# Patient Record
Sex: FEMALE | Race: BLACK OR AFRICAN AMERICAN | NOT HISPANIC OR LATINO
[De-identification: names, ages, dates, MRNs, and addresses within clinical notes are randomized per-mention and may not be internally consistent; named-entity substitution may affect disease eponyms.]

---
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
## 2021-12-24 ENCOUNTER — EMERGENCY (EMERGENCY)
Facility: HOSPITAL | Age: 21
LOS: 1 days | Discharge: HOME | End: 2021-12-24
Attending: EMERGENCY MEDICINE | Admitting: EMERGENCY MEDICINE
Payer: COMMERCIAL

## 2021-12-24 VITALS
OXYGEN SATURATION: 100 % | HEART RATE: 95 BPM | RESPIRATION RATE: 18 BRPM | DIASTOLIC BLOOD PRESSURE: 79 MMHG | TEMPERATURE: 98 F | SYSTOLIC BLOOD PRESSURE: 128 MMHG

## 2021-12-24 VITALS
SYSTOLIC BLOOD PRESSURE: 120 MMHG | DIASTOLIC BLOOD PRESSURE: 80 MMHG | HEART RATE: 98 BPM | TEMPERATURE: 100 F | OXYGEN SATURATION: 100 % | RESPIRATION RATE: 18 BRPM

## 2021-12-24 LAB
APPEARANCE UR: CLEAR — SIGNIFICANT CHANGE UP
BACTERIA # UR AUTO: NEGATIVE — SIGNIFICANT CHANGE UP
BILIRUB UR-MCNC: NEGATIVE — SIGNIFICANT CHANGE UP
COLOR SPEC: SIGNIFICANT CHANGE UP
DIFF PNL FLD: NEGATIVE — SIGNIFICANT CHANGE UP
EPI CELLS # UR: SIGNIFICANT CHANGE UP
FLUAV AG NPH QL: SIGNIFICANT CHANGE UP
FLUBV AG NPH QL: SIGNIFICANT CHANGE UP
GLUCOSE UR QL: NEGATIVE — SIGNIFICANT CHANGE UP
KETONES UR-MCNC: ABNORMAL
LEUKOCYTE ESTERASE UR-ACNC: ABNORMAL
NITRITE UR-MCNC: NEGATIVE — SIGNIFICANT CHANGE UP
PH UR: 6 — SIGNIFICANT CHANGE UP (ref 5–8)
PROT UR-MCNC: ABNORMAL
RBC CASTS # UR COMP ASSIST: 0 /HPF — SIGNIFICANT CHANGE UP (ref 0–4)
RSV RNA NPH QL NAA+NON-PROBE: SIGNIFICANT CHANGE UP
SARS-COV-2 RNA SPEC QL NAA+PROBE: DETECTED
SP GR SPEC: 1.02 — SIGNIFICANT CHANGE UP (ref 1–1.05)
UROBILINOGEN FLD QL: SIGNIFICANT CHANGE UP
WBC UR QL: 2 /HPF — SIGNIFICANT CHANGE UP (ref 0–5)

## 2021-12-24 PROCEDURE — 99284 EMERGENCY DEPT VISIT MOD MDM: CPT

## 2021-12-24 RX ORDER — LIDOCAINE 4 G/100G
1 CREAM TOPICAL ONCE
Refills: 0 | Status: COMPLETED | OUTPATIENT
Start: 2021-12-24 | End: 2021-12-24

## 2021-12-24 RX ORDER — ACETAMINOPHEN 500 MG
975 TABLET ORAL ONCE
Refills: 0 | Status: COMPLETED | OUTPATIENT
Start: 2021-12-24 | End: 2021-12-24

## 2021-12-24 RX ADMIN — LIDOCAINE 1 PATCH: 4 CREAM TOPICAL at 08:03

## 2021-12-24 RX ADMIN — Medication 975 MILLIGRAM(S): at 08:03

## 2021-12-24 NOTE — ED PROVIDER NOTE - CLINICAL SUMMARY MEDICAL DECISION MAKING FREE TEXT BOX
Dereje Mcgee, PGY-2- 21 year old female with no pmhx, is here for back pain, fever to 101F, sputum production. Vitals stable on arrival. Pt well-appearing, not coughing during exam. Suspect viral infxn. Plan to obtain covid-19, influenza, rsv swab, UA, UCx, likely dc home. Treat back pain. Possible URI vs pyelo vs rhabdo (though suspicion is low)

## 2021-12-24 NOTE — ED PROVIDER NOTE - NSFOLLOWUPINSTRUCTIONS_ED_ALL_ED_FT
1) Follow up with your PCP within 1-3 days of being seen in the ED  2) Return to the ED immediately for new or worsening symptoms difficulty breathing, severe pain, unable to stay hydrated, vomiting, blood in urine   3) Please continue to take any home medications as prescribed  4) Your test results from your ED visit were discussed with you prior to discharge  5) You were provided with a copy of your test results  6) Please take Ibuprofen 600 mg every 6 hours and Tylenol 975 mg every 6 hours for pain. Please follow all pharmacy packaging instructions and warnings. Please take the Ibuprofen with food.

## 2021-12-24 NOTE — ED ADULT NURSE NOTE - OBJECTIVE STATEMENT
Received pt in intake room 7, 21 yr/o female A+Ox4, ambulatory at baseline. presented to the ED C/O lower back pain (lumbar spine) sharp 9/10, pain S/P workout at gym, as per patient back exercise performed. pain radiates down B/L LE denies numbness and tingling to peripheral extremities, no edema noted. pt is able to perform active and passive ROM exercises. pt reports being febrile yesterday. pt has a low grade temp, provider aware, medications given as ordered. denies chest pain and SOB, RR even and unlabored. denies abdominal pain N/V/D/C.

## 2021-12-24 NOTE — ED ADULT NURSE NOTE - BREATH SOUNDS, MLM
Health Maintenance Due   Topic Date Due   • COVID-19 Vaccine (1) Never done   • Colorectal Cancer Screen-  Never done       Patient refuses the covid vaccine, has cologuard         Clear

## 2021-12-24 NOTE — ED PROVIDER NOTE - ATTENDING CONTRIBUTION TO CARE
agree with resident note    "21 year old female with no pmhx, presents to ED for evaluation of lower back pain, fever that began yesterday. Pt reports was doing back/core exercises at the gym a few days ago when back pain began. Notes pain worsened last night, accompanied by fever, congestion, cough with occasional sputum production and 1 episode that had blood tinge in it. 1 episode of night sweats last night when had a fever. Notes no known sick contacts. Took Ibuprofen last night with some relief. No shortness of breath, chest pain, vomiting, diarrhea, abd pain, hematuria, dysuria, numbness, tingling, or weakness. Has been drinking normally. No hx of DVT/PE, denies chance of pregnancy, no recent travel, no tobacco/EtOH/recreational drug use. NKDA. Vaccinated for COVID-19."    PE:

## 2021-12-24 NOTE — ED PROVIDER NOTE - PROGRESS NOTE DETAILS
Dereje Mcgee, PGY-2- POCT urine pregnancy negative. UA negative for infxn. Ucx sent. Advised to quarantine until covid-19 swab is resulted. Discussed results of work up with patient. Patient agrees with plan to discharge home. All questions answered regarding plan. Strict return precautions given.

## 2021-12-24 NOTE — ED PROVIDER NOTE - PATIENT PORTAL LINK FT
You can access the FollowMyHealth Patient Portal offered by [facility] by registering at the following website: [email or web address]. By joining [facility]’s FollowMyHealth portal, you will also be able to view your health information using other applications (apps) compatible with our system.

## 2021-12-24 NOTE — ED PROVIDER NOTE - OBJECTIVE STATEMENT
21 year old female with no pmhx, presents to ED for evaluation of lower back pain, fever that began yesterday. Pt reports was doing back/core exercises at the gym a few days ago when back pain began. Notes pain worsened last night, accompanied by fever, congestion, cough with occasional sputum production and 1 episode that had blood tinge in it. 1 episode of night sweats last night when had a fever. Notes no known sick contacts. Took Ibuprofen last night with some relief. No shortness of breath, chest pain, vomiting, diarrhea, abd pain, hematuria, dysuria, numbness, tingling, or weakness. Has been drinking normally. No hx of DVT/PE, denies chance of pregnancy, no recent travel, no tobacco/EtOH/recreational drug use. NKDA. Vaccinated for COVID-19.

## 2021-12-24 NOTE — ED ADULT TRIAGE NOTE - CHIEF COMPLAINT QUOTE
back pain and fever    pt states developed lower back pain rad to both legs after going to the gym 2 days ago.  yesterday developed fever (tmax 101), cough, expectorating green phlegm with occ blood tinge, runny nose.  denies abd pain, nausea, vomiting, diarrhea, dysuria.  pt is covid vaccinated

## 2021-12-24 NOTE — ED PROVIDER NOTE - PHYSICAL EXAMINATION
General: well appearing, no acute distress, AOx3  Skin: no rash, no pallor  Head: normocephalic, atraumatic  Eyes: clear conjunctiva, EOMI  ENMT: airway patent, no nasal discharge  Cardiovascular: normal rate, normal rhythm, S1/S2  Pulmonary: clear to auscultation bilaterally, no rales, rhonchi, or wheeze  Abdomen: soft, nontender, no cva tenderness b/l   Musculoskeletal: moving extremities well, no deformity, ambulatory in ED, 5/5 strength b/l lower extremities, no c/t/l spine midline tenderness  Psych: normal mood, normal affect

## 2021-12-24 NOTE — ED PROVIDER NOTE - NS ED ROS FT
General: +fever, +chills  Eyes: no vision changes, no eye pain  Cardiovascular: no chest pain, no edema  Respiratory: +cough, no shortness of breath  Gastrointestinal: no abdominal pain, no nausea, no vomiting, no diarrhea  Genitourinary: no dysuria, no hematuria  Musculoskeletal: +muscle pain, no joint pain  Skin: no rash, no lesions  Neuro: no numbness, no tingling  Psych: no depression, no anxiety

## 2021-12-25 LAB
CULTURE RESULTS: SIGNIFICANT CHANGE UP
SPECIMEN SOURCE: SIGNIFICANT CHANGE UP

## 2022-01-22 ENCOUNTER — TRANSCRIPTION ENCOUNTER (OUTPATIENT)
Age: 22
End: 2022-01-22

## 2023-05-15 ENCOUNTER — NON-APPOINTMENT (OUTPATIENT)
Age: 23
End: 2023-05-15

## 2023-05-27 ENCOUNTER — NON-APPOINTMENT (OUTPATIENT)
Age: 23
End: 2023-05-27

## 2023-08-23 ENCOUNTER — NON-APPOINTMENT (OUTPATIENT)
Age: 23
End: 2023-08-23

## 2023-08-23 ENCOUNTER — APPOINTMENT (OUTPATIENT)
Dept: INTERNAL MEDICINE | Facility: CLINIC | Age: 23
End: 2023-08-23
Payer: COMMERCIAL

## 2023-08-23 VITALS
HEART RATE: 57 BPM | HEIGHT: 67 IN | DIASTOLIC BLOOD PRESSURE: 73 MMHG | WEIGHT: 152 LBS | SYSTOLIC BLOOD PRESSURE: 117 MMHG | BODY MASS INDEX: 23.86 KG/M2 | TEMPERATURE: 98 F | OXYGEN SATURATION: 100 % | RESPIRATION RATE: 16 BRPM

## 2023-08-23 DIAGNOSIS — Z29.9 ENCOUNTER FOR PROPHYLACTIC MEASURES, UNSPECIFIED: ICD-10-CM

## 2023-08-23 DIAGNOSIS — Z00.00 ENCOUNTER FOR GENERAL ADULT MEDICAL EXAMINATION W/OUT ABNORMAL FINDINGS: ICD-10-CM

## 2023-08-23 DIAGNOSIS — Z13.31 ENCOUNTER FOR SCREENING FOR DEPRESSION: ICD-10-CM

## 2023-08-23 DIAGNOSIS — Z23 ENCOUNTER FOR IMMUNIZATION: ICD-10-CM

## 2023-08-23 PROCEDURE — 36415 COLL VENOUS BLD VENIPUNCTURE: CPT

## 2023-08-23 PROCEDURE — 93000 ELECTROCARDIOGRAM COMPLETE: CPT

## 2023-08-23 PROCEDURE — 90715 TDAP VACCINE 7 YRS/> IM: CPT

## 2023-08-23 PROCEDURE — 99385 PREV VISIT NEW AGE 18-39: CPT | Mod: 25

## 2023-08-23 PROCEDURE — 90471 IMMUNIZATION ADMIN: CPT

## 2023-08-23 NOTE — ASSESSMENT
[FreeTextEntry1] : #CPE f/u blood and urine ekg - NSR no acute st or t wave changes - interpreted and reviewed results with pt. immunizations - need tdap, denies any seizure from any previous vaccinations as a child. I spent 5 minutes with the patient conducting a screen using approved screening tool (PHQ2) and discussing results of said screen with patient during this encounter. The patient was counseled to get regular exercise and sleep, wear sunblock and wear a safety belt in the car. Check CBC, CMP Hgba1c , TSH and UA Pap Smear Ophtho Derm Dental IF ever have any seizure again - contact pcp and also will recommend neuro f/u. pt clinically is stable.  f/u urine and urine culture as per pt request. no acute sx of UTI experience fever, back pain, dysuria - rto pt agrees and understands plan via teach back method. all questions answered.

## 2023-08-23 NOTE — HISTORY OF PRESENT ILLNESS
[de-identified] : 23 F here for CPE and initial visit establish care.  Pt is going to be working at [employer] as a .   LMP: currently on it.   PMHX: 1 episode of seizure 2/2 to drug intoxication at age 18 from edibile with THC hx of seizure at 18 ate 500mg edible . pt had only 1 episode of seizure at age 18 but never since.  Allergies: NKDA Medications: None Surgical Hx: None Family Hx; Mother HTN Father: TypeIIDM Maternal Grandmother: leukemia -  Maternal Grandfather: unknown  Paternal Grandmother:  - HTN, varcose veins, multiple skin moles Paternal Grandfather: unknown  Siblings: 2 sisters healthy  Social Hx ETOH  -  socially  Tobacco - denies Illict Drug use - socially marajuna - no seizures since 18  Occupation: Work as  [employer]  Pt reports might have had UTI previously sx have resolved request urine culture and ua to be checked denies any dysuria, urniarya frequency or urgency  Pt is daily exercising? Yes  Vaccinations: covid - utd flu - utd tdap - due for employment hpv - vaccinated at pediatrician      Screening: colonoscopy: due at 46 no family hx of colon cancer  obgyn: LMP: currently on period  last pap: none due for pap need obgyn Pregnancy:  currently on period no family hx of breast or ovarian cancer

## 2023-08-23 NOTE — HEALTH RISK ASSESSMENT
[No] : No [No falls in past year] : Patient reported no falls in the past year [0] : 2) Feeling down, depressed, or hopeless: Not at all (0) [PHQ-2 Negative - No further assessment needed] : PHQ-2 Negative - No further assessment needed [HIV test declined] : HIV test declined [Hepatitis C test declined] : Hepatitis C test declined [None] : None [With Family] : lives with family [Employed] : employed [College] : College [Single] : single [Sexually Active] : sexually active [Feels Safe at Home] : Feels safe at home [Fully functional (bathing, dressing, toileting, transferring, walking, feeding)] : Fully functional (bathing, dressing, toileting, transferring, walking, feeding) [Fully functional (using the telephone, shopping, preparing meals, housekeeping, doing laundry, using] : Fully functional and needs no help or supervision to perform IADLs (using the telephone, shopping, preparing meals, housekeeping, doing laundry, using transportation, managing medications and managing finances) [Reports normal functional visual acuity (ie: able to read med bottle)] : Reports normal functional visual acuity [Smoke Detector] : smoke detector [Carbon Monoxide Detector] : carbon monoxide detector [Safety elements used in home] : safety elements used in home [Seat Belt] :  uses seat belt [Sunscreen] : uses sunscreen [Never] : Never [[id number]] : 0 [Change in mental status noted] : No change in mental status noted [Language] : denies difficulty with language [Behavior] : denies difficulty with behavior [Learning/Retaining New Information] : denies difficulty learning/retaining new information [Handling Complex Tasks] : denies difficulty handling complex tasks [Reasoning] : denies difficulty with reasoning [Spatial Ability and Orientation] : denies difficulty with spatial ability and orientation [High Risk Behavior] : no high risk behavior [Reports changes in hearing] : Reports no changes in hearing [Reports changes in vision] : Reports no changes in vision [Reports changes in dental health] : Reports no changes in dental health [Guns at Home] : no guns at home [Travel to Developing Areas] : does not  travel to developing areas [TB Exposure] : is not being exposed to tuberculosis [Caregiver Concerns] : does not have caregiver concerns

## 2023-08-24 ENCOUNTER — NON-APPOINTMENT (OUTPATIENT)
Age: 23
End: 2023-08-24

## 2023-08-24 LAB
25(OH)D3 SERPL-MCNC: 20.2 NG/ML
ALBUMIN SERPL ELPH-MCNC: 4.5 G/DL
ALP BLD-CCNC: 47 U/L
ALT SERPL-CCNC: 10 U/L
ANION GAP SERPL CALC-SCNC: 10 MMOL/L
APPEARANCE: CLEAR
AST SERPL-CCNC: 18 U/L
BACTERIA: ABNORMAL /HPF
BILIRUB SERPL-MCNC: 0.4 MG/DL
BILIRUBIN URINE: NEGATIVE
BLOOD URINE: ABNORMAL
BUN SERPL-MCNC: 11 MG/DL
CALCIUM SERPL-MCNC: 9.6 MG/DL
CAST: 0 /LPF
CHLORIDE SERPL-SCNC: 103 MMOL/L
CHOLEST SERPL-MCNC: 181 MG/DL
CO2 SERPL-SCNC: 24 MMOL/L
COLOR: YELLOW
CREAT SERPL-MCNC: 0.89 MG/DL
EGFR: 94 ML/MIN/1.73M2
EPITHELIAL CELLS: 2 /HPF
ESTIMATED AVERAGE GLUCOSE: 105 MG/DL
FOLATE SERPL-MCNC: 14.7 NG/ML
GLUCOSE QUALITATIVE U: NEGATIVE MG/DL
GLUCOSE SERPL-MCNC: 79 MG/DL
HBA1C MFR BLD HPLC: 5.3 %
HDLC SERPL-MCNC: 88 MG/DL
KETONES URINE: NEGATIVE MG/DL
LDLC SERPL CALC-MCNC: 84 MG/DL
LEUKOCYTE ESTERASE URINE: ABNORMAL
MICROSCOPIC-UA: NORMAL
NITRITE URINE: NEGATIVE
NONHDLC SERPL-MCNC: 93 MG/DL
PH URINE: 6
POTASSIUM SERPL-SCNC: 3.8 MMOL/L
PROT SERPL-MCNC: 7 G/DL
PROTEIN URINE: NORMAL MG/DL
RED BLOOD CELLS URINE: 0 /HPF
SODIUM SERPL-SCNC: 136 MMOL/L
SPECIFIC GRAVITY URINE: 1.01
TRIGL SERPL-MCNC: 44 MG/DL
TSH SERPL-ACNC: 1.78 UIU/ML
UROBILINOGEN URINE: 0.2 MG/DL
VIT B12 SERPL-MCNC: 486 PG/ML
WHITE BLOOD CELLS URINE: 11 /HPF

## 2023-08-25 DIAGNOSIS — E55.9 VITAMIN D DEFICIENCY, UNSPECIFIED: ICD-10-CM

## 2023-08-25 RX ORDER — ERGOCALCIFEROL 1.25 MG/1
1.25 MG CAPSULE, LIQUID FILLED ORAL
Qty: 8 | Refills: 0 | Status: ACTIVE | COMMUNITY
Start: 2023-08-25 | End: 1900-01-01

## 2023-08-28 DIAGNOSIS — Z87.440 PERSONAL HISTORY OF URINARY (TRACT) INFECTIONS: ICD-10-CM

## 2023-08-28 LAB — BACTERIA UR CULT: ABNORMAL

## 2023-08-28 RX ORDER — NITROFURANTOIN MACROCRYSTALS 100 MG/1
100 CAPSULE ORAL
Qty: 10 | Refills: 0 | Status: ACTIVE | COMMUNITY
Start: 2023-08-28 | End: 1900-01-01

## 2023-11-03 ENCOUNTER — NON-APPOINTMENT (OUTPATIENT)
Age: 23
End: 2023-11-03

## 2023-11-11 ENCOUNTER — APPOINTMENT (OUTPATIENT)
Dept: CT IMAGING | Facility: IMAGING CENTER | Age: 23
End: 2023-11-11

## 2023-11-16 ENCOUNTER — APPOINTMENT (OUTPATIENT)
Dept: ORTHOPEDIC SURGERY | Facility: CLINIC | Age: 23
End: 2023-11-16

## 2024-02-03 ENCOUNTER — NON-APPOINTMENT (OUTPATIENT)
Age: 24
End: 2024-02-03

## 2024-04-04 ENCOUNTER — EMERGENCY (EMERGENCY)
Facility: HOSPITAL | Age: 24
LOS: 1 days | Discharge: HOME | End: 2024-04-04
Admitting: STUDENT IN AN ORGANIZED HEALTH CARE EDUCATION/TRAINING PROGRAM
Payer: COMMERCIAL

## 2024-04-04 VITALS
DIASTOLIC BLOOD PRESSURE: 75 MMHG | SYSTOLIC BLOOD PRESSURE: 113 MMHG | OXYGEN SATURATION: 97 % | TEMPERATURE: 99 F | HEART RATE: 75 BPM | RESPIRATION RATE: 17 BRPM

## 2024-04-04 VITALS
HEART RATE: 76 BPM | SYSTOLIC BLOOD PRESSURE: 127 MMHG | DIASTOLIC BLOOD PRESSURE: 84 MMHG | WEIGHT: 160.06 LBS | TEMPERATURE: 98 F | OXYGEN SATURATION: 100 % | RESPIRATION RATE: 16 BRPM

## 2024-04-04 LAB
ALBUMIN SERPL ELPH-MCNC: 4.6 G/DL — SIGNIFICANT CHANGE UP (ref 3.3–5)
ALP SERPL-CCNC: 55 U/L — SIGNIFICANT CHANGE UP (ref 40–120)
ALT FLD-CCNC: 10 U/L — SIGNIFICANT CHANGE UP (ref 4–33)
ANION GAP SERPL CALC-SCNC: 12 MMOL/L — SIGNIFICANT CHANGE UP (ref 7–14)
APPEARANCE UR: ABNORMAL
AST SERPL-CCNC: 11 U/L — SIGNIFICANT CHANGE UP (ref 4–32)
BACTERIA # UR AUTO: ABNORMAL /HPF
BASE EXCESS BLDV CALC-SCNC: 2 MMOL/L — SIGNIFICANT CHANGE UP (ref -2–3)
BASOPHILS # BLD AUTO: 0.03 K/UL — SIGNIFICANT CHANGE UP (ref 0–0.2)
BASOPHILS NFR BLD AUTO: 0.3 % — SIGNIFICANT CHANGE UP (ref 0–2)
BILIRUB SERPL-MCNC: 0.5 MG/DL — SIGNIFICANT CHANGE UP (ref 0.2–1.2)
BILIRUB UR-MCNC: NEGATIVE — SIGNIFICANT CHANGE UP
BLOOD GAS VENOUS COMPREHENSIVE RESULT: SIGNIFICANT CHANGE UP
BUN SERPL-MCNC: 10 MG/DL — SIGNIFICANT CHANGE UP (ref 7–23)
CALCIUM SERPL-MCNC: 9.9 MG/DL — SIGNIFICANT CHANGE UP (ref 8.4–10.5)
CAST: 2 /LPF — SIGNIFICANT CHANGE UP (ref 0–4)
CHLORIDE BLDV-SCNC: 102 MMOL/L — SIGNIFICANT CHANGE UP (ref 96–108)
CHLORIDE SERPL-SCNC: 102 MMOL/L — SIGNIFICANT CHANGE UP (ref 98–107)
CO2 BLDV-SCNC: 29.7 MMOL/L — HIGH (ref 22–26)
CO2 SERPL-SCNC: 25 MMOL/L — SIGNIFICANT CHANGE UP (ref 22–31)
COLOR SPEC: YELLOW — SIGNIFICANT CHANGE UP
CREAT SERPL-MCNC: 0.76 MG/DL — SIGNIFICANT CHANGE UP (ref 0.5–1.3)
DIFF PNL FLD: NEGATIVE — SIGNIFICANT CHANGE UP
EGFR: 114 ML/MIN/1.73M2 — SIGNIFICANT CHANGE UP
EOSINOPHIL # BLD AUTO: 0.05 K/UL — SIGNIFICANT CHANGE UP (ref 0–0.5)
EOSINOPHIL NFR BLD AUTO: 0.5 % — SIGNIFICANT CHANGE UP (ref 0–6)
GAS PNL BLDV: 135 MMOL/L — LOW (ref 136–145)
GLUCOSE BLDV-MCNC: 94 MG/DL — SIGNIFICANT CHANGE UP (ref 70–99)
GLUCOSE SERPL-MCNC: 93 MG/DL — SIGNIFICANT CHANGE UP (ref 70–99)
GLUCOSE UR QL: NEGATIVE MG/DL — SIGNIFICANT CHANGE UP
HCO3 BLDV-SCNC: 28 MMOL/L — SIGNIFICANT CHANGE UP (ref 22–29)
HCT VFR BLD CALC: 37.6 % — SIGNIFICANT CHANGE UP (ref 34.5–45)
HCT VFR BLDA CALC: 38 % — SIGNIFICANT CHANGE UP (ref 34.5–46.5)
HGB BLD CALC-MCNC: 12.5 G/DL — SIGNIFICANT CHANGE UP (ref 11.7–16.1)
HGB BLD-MCNC: 11.9 G/DL — SIGNIFICANT CHANGE UP (ref 11.5–15.5)
IANC: 6.76 K/UL — SIGNIFICANT CHANGE UP (ref 1.8–7.4)
IMM GRANULOCYTES NFR BLD AUTO: 0.3 % — SIGNIFICANT CHANGE UP (ref 0–0.9)
KETONES UR-MCNC: NEGATIVE MG/DL — SIGNIFICANT CHANGE UP
LACTATE BLDV-MCNC: 0.6 MMOL/L — SIGNIFICANT CHANGE UP (ref 0.5–2)
LEUKOCYTE ESTERASE UR-ACNC: ABNORMAL
LYMPHOCYTES # BLD AUTO: 2.48 K/UL — SIGNIFICANT CHANGE UP (ref 1–3.3)
LYMPHOCYTES # BLD AUTO: 24.6 % — SIGNIFICANT CHANGE UP (ref 13–44)
MCHC RBC-ENTMCNC: 26.5 PG — LOW (ref 27–34)
MCHC RBC-ENTMCNC: 31.6 GM/DL — LOW (ref 32–36)
MCV RBC AUTO: 83.7 FL — SIGNIFICANT CHANGE UP (ref 80–100)
MONOCYTES # BLD AUTO: 0.74 K/UL — SIGNIFICANT CHANGE UP (ref 0–0.9)
MONOCYTES NFR BLD AUTO: 7.3 % — SIGNIFICANT CHANGE UP (ref 2–14)
NEUTROPHILS # BLD AUTO: 6.76 K/UL — SIGNIFICANT CHANGE UP (ref 1.8–7.4)
NEUTROPHILS NFR BLD AUTO: 67 % — SIGNIFICANT CHANGE UP (ref 43–77)
NITRITE UR-MCNC: NEGATIVE — SIGNIFICANT CHANGE UP
NRBC # BLD: 0 /100 WBCS — SIGNIFICANT CHANGE UP (ref 0–0)
NRBC # FLD: 0 K/UL — SIGNIFICANT CHANGE UP (ref 0–0)
PCO2 BLDV: 50 MMHG — SIGNIFICANT CHANGE UP (ref 39–52)
PH BLDV: 7.36 — SIGNIFICANT CHANGE UP (ref 7.32–7.43)
PH UR: 6 — SIGNIFICANT CHANGE UP (ref 5–8)
PLATELET # BLD AUTO: 223 K/UL — SIGNIFICANT CHANGE UP (ref 150–400)
PO2 BLDV: 25 MMHG — SIGNIFICANT CHANGE UP (ref 25–45)
POTASSIUM BLDV-SCNC: 3.9 MMOL/L — SIGNIFICANT CHANGE UP (ref 3.5–5.1)
POTASSIUM SERPL-MCNC: 4 MMOL/L — SIGNIFICANT CHANGE UP (ref 3.5–5.3)
POTASSIUM SERPL-SCNC: 4 MMOL/L — SIGNIFICANT CHANGE UP (ref 3.5–5.3)
PROT SERPL-MCNC: 7.9 G/DL — SIGNIFICANT CHANGE UP (ref 6–8.3)
PROT UR-MCNC: NEGATIVE MG/DL — SIGNIFICANT CHANGE UP
RBC # BLD: 4.49 M/UL — SIGNIFICANT CHANGE UP (ref 3.8–5.2)
RBC # FLD: 12.8 % — SIGNIFICANT CHANGE UP (ref 10.3–14.5)
RBC CASTS # UR COMP ASSIST: 0 /HPF — SIGNIFICANT CHANGE UP (ref 0–4)
SAO2 % BLDV: 33.1 % — LOW (ref 67–88)
SODIUM SERPL-SCNC: 139 MMOL/L — SIGNIFICANT CHANGE UP (ref 135–145)
SP GR SPEC: 1.02 — SIGNIFICANT CHANGE UP (ref 1–1.03)
SQUAMOUS # UR AUTO: 10 /HPF — HIGH (ref 0–5)
UROBILINOGEN FLD QL: 0.2 MG/DL — SIGNIFICANT CHANGE UP (ref 0.2–1)
WBC # BLD: 10.09 K/UL — SIGNIFICANT CHANGE UP (ref 3.8–10.5)
WBC # FLD AUTO: 10.09 K/UL — SIGNIFICANT CHANGE UP (ref 3.8–10.5)
WBC UR QL: 104 /HPF — HIGH (ref 0–5)

## 2024-04-04 PROCEDURE — 99285 EMERGENCY DEPT VISIT HI MDM: CPT

## 2024-04-04 PROCEDURE — 72193 CT PELVIS W/DYE: CPT | Mod: 26,MC

## 2024-04-04 RX ORDER — AMPICILLIN SODIUM AND SULBACTAM SODIUM 250; 125 MG/ML; MG/ML
3 INJECTION, POWDER, FOR SUSPENSION INTRAMUSCULAR; INTRAVENOUS ONCE
Refills: 0 | Status: COMPLETED | OUTPATIENT
Start: 2024-04-04 | End: 2024-04-04

## 2024-04-04 RX ORDER — HYDROMORPHONE HYDROCHLORIDE 2 MG/ML
0.5 INJECTION INTRAMUSCULAR; INTRAVENOUS; SUBCUTANEOUS ONCE
Refills: 0 | Status: DISCONTINUED | OUTPATIENT
Start: 2024-04-04 | End: 2024-04-04

## 2024-04-04 RX ORDER — KETOROLAC TROMETHAMINE 30 MG/ML
15 SYRINGE (ML) INJECTION ONCE
Refills: 0 | Status: DISCONTINUED | OUTPATIENT
Start: 2024-04-04 | End: 2024-04-04

## 2024-04-04 RX ORDER — SODIUM CHLORIDE 9 MG/ML
1000 INJECTION INTRAMUSCULAR; INTRAVENOUS; SUBCUTANEOUS ONCE
Refills: 0 | Status: COMPLETED | OUTPATIENT
Start: 2024-04-04 | End: 2024-04-04

## 2024-04-04 RX ADMIN — Medication 15 MILLIGRAM(S): at 20:05

## 2024-04-04 RX ADMIN — SODIUM CHLORIDE 1000 MILLILITER(S): 9 INJECTION INTRAMUSCULAR; INTRAVENOUS; SUBCUTANEOUS at 19:52

## 2024-04-04 RX ADMIN — AMPICILLIN SODIUM AND SULBACTAM SODIUM 3 GRAM(S): 250; 125 INJECTION, POWDER, FOR SUSPENSION INTRAMUSCULAR; INTRAVENOUS at 23:03

## 2024-04-04 RX ADMIN — HYDROMORPHONE HYDROCHLORIDE 0.5 MILLIGRAM(S): 2 INJECTION INTRAMUSCULAR; INTRAVENOUS; SUBCUTANEOUS at 21:34

## 2024-04-04 RX ADMIN — Medication 15 MILLIGRAM(S): at 18:53

## 2024-04-04 RX ADMIN — AMPICILLIN SODIUM AND SULBACTAM SODIUM 200 GRAM(S): 250; 125 INJECTION, POWDER, FOR SUSPENSION INTRAMUSCULAR; INTRAVENOUS at 22:33

## 2024-04-04 RX ADMIN — SODIUM CHLORIDE 1000 MILLILITER(S): 9 INJECTION INTRAMUSCULAR; INTRAVENOUS; SUBCUTANEOUS at 18:52

## 2024-04-04 RX ADMIN — HYDROMORPHONE HYDROCHLORIDE 0.5 MILLIGRAM(S): 2 INJECTION INTRAMUSCULAR; INTRAVENOUS; SUBCUTANEOUS at 22:34

## 2024-04-04 NOTE — ED PROVIDER NOTE - NS ED MD DISPO DISCHARGE CCDA
Patient/Caregiver provided printed discharge information.
no hematuria/no diarrhea/no fever/no nausea/no blood in stool/no chills

## 2024-04-04 NOTE — ED ADULT NURSE NOTE - NSFALLUNIVINTERV_ED_ALL_ED
Bed/Stretcher in lowest position, wheels locked, appropriate side rails in place/Call bell, personal items and telephone in reach/Instruct patient to call for assistance before getting out of bed/chair/stretcher/Non-slip footwear applied when patient is off stretcher/Wake to call system/Physically safe environment - no spills, clutter or unnecessary equipment/Purposeful proactive rounding/Room/bathroom lighting operational, light cord in reach

## 2024-04-04 NOTE — ED ADULT NURSE NOTE - OBJECTIVE STATEMENT
23 year old female received to wellness c/o redness and tenderness to left buttock stating "I think its an abscess, I have gotten them before in my armpit and usually a warm compress makes them go away". redness and swelling noted to left buttock. pt denies fevers/chills. PIV #20 left AC, labs drawn and sent. vs as noted. meds given as ordered.

## 2024-04-04 NOTE — ED PROVIDER NOTE - PATIENT PORTAL LINK FT
You can access the FollowMyHealth Patient Portal offered by [facility] by registering at the following website: [email or web address]. By joining [facility]’s FollowMyHealth portal, you will also be able to view your health information using other applications (apps) compatible with our system.

## 2024-04-04 NOTE — PROCEDURE NOTE - ADDITIONAL PROCEDURE DETAILS
1% Lidocaine w/ Epi 15cc infiltrated into area surrounding abscess. Given small size of abscess (1.3cm) aspiration of abscess cavity was done using ultrasound guidance and an 18g needle; 3cc pus was aspirated from pocket. Patient tolerated procedure well. Culture sent.

## 2024-04-04 NOTE — ED PROVIDER NOTE - OBJECTIVE STATEMENT
23 year old female with no known PMH presents to the ED complaining of pain in the gluteal area for 3 days. Pt states that she developed painful lesion in the left gluteal area which got bigger. Denies pain with bowel movements, abdominal pain, nausea, vomiting, diarrhea, fevers, chills or any other associated complaints.

## 2024-04-04 NOTE — ED PROVIDER NOTE - CLINICAL SUMMARY MEDICAL DECISION MAKING FREE TEXT BOX
23 year old female with no known PMH presents to the ED complaining of pain in the gluteal area for 3 days. HD stable. Abdomen soft nontender, left gluteal cleft noted with area of induration and fluctuance. Imp: Gluteal pain, concerning for abscess. Plan for labs, CT pelvis, analgesics, antiemetic, reassess.

## 2024-04-04 NOTE — ED PROVIDER NOTE - IV ALTEPLASE DOOR HIDDEN
CP REHAB NOTE    Chart Review: Patient admitted for acute hypoxic respiratory failure with acute pulmonary edema. Medical History: angina, HF, Afib, high cholesterol, HTN, CKD  EF 55-60%, Echo 6/19/2017  Non Smoker    Patient is currently confused and oriented x2. Palliative has been consulted and Hospice has been discussed with family. Family is currently considering options for their loved one. Will continue to follow and teach if it becomes indicated.
Cardiopulmonary Rehab Nursing Entry:    Chart Review: Patient admitted for acute hypoxic respiratory failure with acute pulmonary edema. Medical History: angina, HF, Afib, high cholesterol, HTN, CKD  EF 55-60%, Echo 6/19/2017  Non Smoker    Discharge with hospice and [facility] planned. CHF teaching not appropriate.
show

## 2024-04-04 NOTE — ED ADULT TRIAGE NOTE - CHIEF COMPLAINT QUOTE
c/o " bump on L buttock cheek" Patient reports bump has increased in size and is now inflamed, warm to touch and painful . Denies fevers, chills. Patient noted with difficulty sitting in triage chair. denies hx

## 2024-04-04 NOTE — ED PROVIDER NOTE - NSFOLLOWUPINSTRUCTIONS_ED_ALL_ED_FT
Please take Augmentin 1 tablet every 12 hours for 10 days and Ibuprofen 600mg every 6 hours for pain control.     Follow up with your PMD within 1-2 days or you can call our clinic at [phone] for an appointment  Take all of your other medications as previously prescribed.  Worsening, continued or ANY new concerning symptoms return to the Emergency Department.    Incision and Drainage, Care After  After incision and drainage, it is common to have:  Pain or discomfort around the incision site.  Blood, fluid, or pus (drainage) from the incision.  Redness and firm skin around the incision site.  Follow these instructions at home:  Medicines    Take over-the-counter and prescription medicines only as told by your health care provider.  If you were prescribed antibiotics, take them as told by your provider. Do not stop using the antibiotic even if you start to feel better.  Do not apply creams, ointments, or liquids unless you have been told to by your provider.  Wound care    Two stitched wounds. One is normal. The other is red with pus and infected.   Follow instructions from your provider about how to take care of your wound. Make sure you:  Wash your hands with soap and water for at least 20 seconds before and after you change your bandage (dressing). If soap and water are not available, use hand .  Change your dressing and any packing as told by your provider.  If the dressing is dry or stuck when you try to remove it, moisten or wet it with saline or water. This will help you remove it without harming your skin or tissues.  If your wound is packed, leave it in place until your provider tells you to remove it. To remove it, moisten or wet the packing with saline or water.  Leave stitches (sutures), skin glue, or tape strips in place. These skin closures may need to stay in place for 2 weeks or longer. If tape strip edges start to loosen and curl up, you may trim the loose edges. Do not remove tape strips completely unless your provider tells you to do that.  Check your wound every day for signs of infection. Check for:  More redness, swelling, or pain.  More fluid or blood.  Warmth.  Pus or a bad smell.  If you were sent home with a drain tube in place, follow instructions from your provider about:  How to empty it.  How to care for it at home.  Be careful when you get rid of used dressings, wound packing, or drainage.    Activity    Rest the affected area.  Return to your normal activities as told by your provider. Ask your provider what activities are safe for you.  General instructions    Do not use any products that contain nicotine or tobacco. These products include cigarettes, chewing tobacco, and vaping devices, such as e-cigarettes. These can delay incision healing after surgery. If you need help quitting, ask your provider.  Do not take baths, swim, or use a hot tub until your provider approves. Ask your provider if you may take showers. You may only be allowed to take sponge baths.  The incision will keep draining. It is normal to have some clear or slightly bloody drainage. The amount of drainage should go down each day.  Keep all follow-up visits. Your provider will need to make sure that your incision is healing well and that there are no problems.  Your health care provider may give you more instructions. Make sure you know what you can and cannot do    Contact a health care provider if:  Your cyst or abscess comes back.  You have any signs of infection.  You notice red streaks that spread away from the incision site.  You have a fever or chills.  Get help right away if:  You have severe pain or bleeding.  You become short of breath.  You have chest pain.  You have signs of a severe infection. You may notice changes in your incision area, such as:  Swelling that makes the skin feel hard.  Numbness or tingling.  Sudden increase in redness. Your skin color may change from red to purple, and then to dark spots.  Blisters, ulcers, or splitting of the skin.

## 2024-04-04 NOTE — ED PROVIDER NOTE - PROGRESS NOTE DETAILS
MATTIE Ernandez: Labs reviewed, UA showed UTI. CT shows "Soft tissue abscess superficial to the coccyx." Pt was seen and evaluated by Surgery consult, I&D performed at bedside. Pt reassessed, states she feels better. clinically stable for discharge home. pmd follow up. strict return precautions.

## 2024-04-05 LAB
GRAM STN FLD: ABNORMAL
SPECIMEN SOURCE: SIGNIFICANT CHANGE UP

## 2024-04-05 NOTE — CONSULT NOTE ADULT - SUBJECTIVE AND OBJECTIVE BOX
HPI: 23F w PMH axillary abscess presents to ED with 3d buttock pain, which progressively became worse.    In ED patient HDS. Labs unremarkable. CT Pelvis w/ IV showing 1.3X0.5cm gluteal abscess and fat stranding. Patient endorses this is the first time this has happened to her. Denies hx of CKD, DM, hidradenitis, etc. Endorses she did have an axillary abscess once that was deemed due to deodorant use. Denies discharge from below or groin abscesses, fistulous sinous tracts or scarring.    Discussed with Colorectal Surgery regarding location, unlikely pilonidal.    PMH: As above    PSH: As above    Allergies: NKDA    Physical exam:    /75 HR 75 SPO2 97 T 99.1 RR 17    General- NAD. Pleasant and responsive  Lungs- Comfortable on room air  - L gluteal area of induration and tenderness, no pus    Imaging:    ACC: [id number] EXAM:  CT PELVIS ONLY IC   ORDERED BY: XU SOLARES     PROCEDURE DATE:  04/04/2024          INTERPRETATION:  CLINICAL INFORMATION: pain and induration along the   pilonidal area    COMPARISON: None.    CONTRAST/COMPLICATIONS:  IV Contrast: Omnipaque 350  90 cc administered   10 cc discarded  Oral Contrast: NONE  Complications: None reported at time of study completion    PROCEDURE:  CT of the Pelvis was performed.  Sagittal and coronal reformats were performed.    FINDINGS:  BLADDER: Minimally distended.  REPRODUCTIVE ORGANS: Uterus and adnexa within normal limits.  LYMPH NODES: No pelvic lymphadenopathy.    VISUALIZED PORTIONS:  ABDOMINAL ORGANS: Within normal limits.  BOWEL: Within normal limits. Appendix is normal.  PERITONEUM: No ascites.  VESSELS: Within normal limits.  ABDOMINAL WALL: 1.3 x 0.5 cm fluid collection with surrounding thick rim   and subcutaneous fat stranding in the central posterior abdominal wall   superficial to the coccyx (301-41).  BONES: Within normal limits.    IMPRESSION:  Soft tissue abscess superficial to the coccyx.        --- End of Report ---          SYDNIE MOMIN MD; Resident Radiologist  This document has been electronically signed.  CLAUDIA FAUSTIN MD; Attending Radiologist  This document has been electronically signed. Apr 4 2024  7:13PM

## 2024-04-05 NOTE — CONSULT NOTE ADULT - ASSESSMENT
23F w/ PMH axillary abscess presents with gluteal abscess.    -S/p aspiration of abscess under U/S guidance  -Received Unasyn in ED  -PO Augmentin x  7d on discharge  -F/u in [facility] clinic  -Culture sent  -See separate procedure note    D/w Dr. Chelle Hughes MD  PGY-3  B Team

## 2024-04-06 NOTE — ED POST DISCHARGE NOTE - RESULT SUMMARY
Abscess cx- (+) staph- i sent rx for Augmentin to the pharm. Strict ED return precautions discussed. Patient understands and agrees.

## 2024-04-07 LAB
CULTURE RESULTS: ABNORMAL
SPECIMEN SOURCE: SIGNIFICANT CHANGE UP

## 2024-04-09 ENCOUNTER — LABORATORY RESULT (OUTPATIENT)
Age: 24
End: 2024-04-09

## 2024-04-09 ENCOUNTER — APPOINTMENT (OUTPATIENT)
Dept: OBGYN | Facility: CLINIC | Age: 24
End: 2024-04-09
Payer: COMMERCIAL

## 2024-04-09 DIAGNOSIS — Z01.419 ENCOUNTER FOR GYNECOLOGICAL EXAMINATION (GENERAL) (ROUTINE) W/OUT ABNORMAL FINDINGS: ICD-10-CM

## 2024-04-09 PROCEDURE — 99385 PREV VISIT NEW AGE 18-39: CPT

## 2024-04-09 NOTE — DISCUSSION/SUMMARY
[FreeTextEntry1] : 23 year old P0 presenting for annual exam -f/u pap and GC/CT -f/u STD screen -Contraception: none but Rx Junel to take to stop period for upcoming trip -f/u PRN

## 2024-04-09 NOTE — HISTORY OF PRESENT ILLNESS
[FreeTextEntry1] : Patient is a 23 year old P0 presenting for an annual visit. LMP 3/27/24 menses monthly. She is feeling well and is without complaints. She denies vaginal itching, odor and discharge. Denies urinary urgency, frequency and dysuria. She is currently sexually active. This is her first GYN exam. Patient recently seen in ED for jeanne-anal abscess which was drained - she is currently taking amoxicillin.

## 2024-04-10 LAB
HCT VFR BLD CALC: 39.6 %
HGB BLD-MCNC: 11.7 G/DL
MCHC RBC-ENTMCNC: 26.9 PG
MCHC RBC-ENTMCNC: 29.5 GM/DL
MCV RBC AUTO: 91 FL
PLATELET # BLD AUTO: 258 K/UL
RBC # BLD: 4.35 M/UL
RBC # FLD: 13.3 %
WBC # FLD AUTO: 7.42 K/UL

## 2024-04-11 LAB
C TRACH RRNA SPEC QL NAA+PROBE: NOT DETECTED
HAV IGM SER QL: NONREACTIVE
HBV CORE IGM SER QL: NONREACTIVE
HBV SURFACE AG SER QL: NONREACTIVE
HCV AB SER QL: NONREACTIVE
HCV S/CO RATIO: 0.09 S/CO
HIV1+2 AB SPEC QL IA.RAPID: NONREACTIVE
N GONORRHOEA RRNA SPEC QL NAA+PROBE: NOT DETECTED
SOURCE AMPLIFICATION: NORMAL
T PALLIDUM AB SER QL IA: NEGATIVE

## 2024-04-11 RX ORDER — NORETHINDRONE ACETATE AND ETHINYL ESTRADIOL AND FERROUS FUMARATE 1MG-20(21)
1-20 KIT ORAL DAILY
Qty: 3 | Refills: 1 | Status: ACTIVE | COMMUNITY
Start: 2024-04-09 | End: 1900-01-01

## 2024-04-26 LAB — CYTOLOGY CVX/VAG DOC THIN PREP: ABNORMAL

## 2024-08-07 ENCOUNTER — EMERGENCY (EMERGENCY)
Facility: HOSPITAL | Age: 24
LOS: 1 days | Discharge: HOME | End: 2024-08-07
Admitting: STUDENT IN AN ORGANIZED HEALTH CARE EDUCATION/TRAINING PROGRAM
Payer: COMMERCIAL

## 2024-08-07 VITALS
RESPIRATION RATE: 16 BRPM | HEART RATE: 68 BPM | WEIGHT: 175.05 LBS | TEMPERATURE: 98 F | OXYGEN SATURATION: 99 % | DIASTOLIC BLOOD PRESSURE: 84 MMHG | SYSTOLIC BLOOD PRESSURE: 132 MMHG

## 2024-08-07 PROCEDURE — 99283 EMERGENCY DEPT VISIT LOW MDM: CPT

## 2024-08-26 ENCOUNTER — APPOINTMENT (OUTPATIENT)
Dept: INTERNAL MEDICINE | Facility: CLINIC | Age: 24
End: 2024-08-26

## 2024-11-13 ENCOUNTER — EMERGENCY (EMERGENCY)
Facility: HOSPITAL | Age: 24
LOS: 1 days | Discharge: HOME | End: 2024-11-13
Admitting: STUDENT IN AN ORGANIZED HEALTH CARE EDUCATION/TRAINING PROGRAM
Payer: COMMERCIAL

## 2024-11-13 VITALS
OXYGEN SATURATION: 99 % | WEIGHT: 162.92 LBS | SYSTOLIC BLOOD PRESSURE: 136 MMHG | RESPIRATION RATE: 16 BRPM | HEART RATE: 67 BPM | DIASTOLIC BLOOD PRESSURE: 90 MMHG | TEMPERATURE: 98 F | HEIGHT: 67 IN

## 2024-11-13 LAB
ALBUMIN SERPL ELPH-MCNC: 4.4 G/DL — SIGNIFICANT CHANGE UP (ref 3.3–5)
ALP SERPL-CCNC: 51 U/L — SIGNIFICANT CHANGE UP (ref 40–120)
ALT FLD-CCNC: 11 U/L — SIGNIFICANT CHANGE UP (ref 4–33)
ANION GAP SERPL CALC-SCNC: 13 MMOL/L — SIGNIFICANT CHANGE UP (ref 7–14)
AST SERPL-CCNC: 16 U/L — SIGNIFICANT CHANGE UP (ref 4–32)
BASOPHILS # BLD AUTO: 0.06 K/UL — SIGNIFICANT CHANGE UP (ref 0–0.2)
BASOPHILS NFR BLD AUTO: 0.8 % — SIGNIFICANT CHANGE UP (ref 0–2)
BILIRUB SERPL-MCNC: 1.1 MG/DL — SIGNIFICANT CHANGE UP (ref 0.2–1.2)
BUN SERPL-MCNC: 10 MG/DL — SIGNIFICANT CHANGE UP (ref 7–23)
CALCIUM SERPL-MCNC: 9.6 MG/DL — SIGNIFICANT CHANGE UP (ref 8.4–10.5)
CHLORIDE SERPL-SCNC: 105 MMOL/L — SIGNIFICANT CHANGE UP (ref 98–107)
CO2 SERPL-SCNC: 22 MMOL/L — SIGNIFICANT CHANGE UP (ref 22–31)
CREAT SERPL-MCNC: 0.92 MG/DL — SIGNIFICANT CHANGE UP (ref 0.5–1.3)
EGFR: 90 ML/MIN/1.73M2 — SIGNIFICANT CHANGE UP
EGFR: 90 ML/MIN/1.73M2 — SIGNIFICANT CHANGE UP
EOSINOPHIL # BLD AUTO: 0.2 K/UL — SIGNIFICANT CHANGE UP (ref 0–0.5)
EOSINOPHIL NFR BLD AUTO: 2.6 % — SIGNIFICANT CHANGE UP (ref 0–6)
GLUCOSE SERPL-MCNC: 88 MG/DL — SIGNIFICANT CHANGE UP (ref 70–99)
HCT VFR BLD CALC: 37.3 % — SIGNIFICANT CHANGE UP (ref 34.5–45)
HGB BLD-MCNC: 11.8 G/DL — SIGNIFICANT CHANGE UP (ref 11.5–15.5)
HIV 1+2 AB+HIV1 P24 AG SERPL QL IA: SIGNIFICANT CHANGE UP
IANC: 3.05 K/UL — SIGNIFICANT CHANGE UP (ref 1.8–7.4)
IMM GRANULOCYTES NFR BLD AUTO: 0.1 % — SIGNIFICANT CHANGE UP (ref 0–0.9)
LYMPHOCYTES # BLD AUTO: 3.97 K/UL — HIGH (ref 1–3.3)
LYMPHOCYTES # BLD AUTO: 51.2 % — HIGH (ref 13–44)
MCHC RBC-ENTMCNC: 26.7 PG — LOW (ref 27–34)
MCHC RBC-ENTMCNC: 31.6 G/DL — LOW (ref 32–36)
MCV RBC AUTO: 84.4 FL — SIGNIFICANT CHANGE UP (ref 80–100)
MONOCYTES # BLD AUTO: 0.46 K/UL — SIGNIFICANT CHANGE UP (ref 0–0.9)
MONOCYTES NFR BLD AUTO: 5.9 % — SIGNIFICANT CHANGE UP (ref 2–14)
NEUTROPHILS # BLD AUTO: 3.05 K/UL — SIGNIFICANT CHANGE UP (ref 1.8–7.4)
NEUTROPHILS NFR BLD AUTO: 39.4 % — LOW (ref 43–77)
NRBC # BLD AUTO: 0 K/UL — SIGNIFICANT CHANGE UP (ref 0–0)
NRBC # BLD: 0 /100 WBCS — SIGNIFICANT CHANGE UP (ref 0–0)
NRBC # FLD: 0 K/UL — SIGNIFICANT CHANGE UP (ref 0–0)
NRBC BLD-RTO: 0 /100 WBCS — SIGNIFICANT CHANGE UP (ref 0–0)
PLATELET # BLD AUTO: 217 K/UL — SIGNIFICANT CHANGE UP (ref 150–400)
POTASSIUM SERPL-MCNC: 3.4 MMOL/L — LOW (ref 3.5–5.3)
POTASSIUM SERPL-SCNC: 3.4 MMOL/L — LOW (ref 3.5–5.3)
PROT SERPL-MCNC: 7.5 G/DL — SIGNIFICANT CHANGE UP (ref 6–8.3)
RBC # BLD: 4.42 M/UL — SIGNIFICANT CHANGE UP (ref 3.8–5.2)
RBC # FLD: 13.2 % — SIGNIFICANT CHANGE UP (ref 10.3–14.5)
SODIUM SERPL-SCNC: 140 MMOL/L — SIGNIFICANT CHANGE UP (ref 135–145)
WBC # BLD: 7.75 K/UL — SIGNIFICANT CHANGE UP (ref 3.8–10.5)
WBC # FLD AUTO: 7.75 K/UL — SIGNIFICANT CHANGE UP (ref 3.8–10.5)

## 2024-11-13 PROCEDURE — 99284 EMERGENCY DEPT VISIT MOD MDM: CPT

## 2024-11-14 LAB
HCV AB S/CO SERPL IA: 0.1 S/CO — SIGNIFICANT CHANGE UP (ref 0–0.99)
HCV AB SERPL-IMP: SIGNIFICANT CHANGE UP

## 2024-11-25 ENCOUNTER — NON-APPOINTMENT (OUTPATIENT)
Age: 24
End: 2024-11-25

## 2024-12-30 ENCOUNTER — NON-APPOINTMENT (OUTPATIENT)
Age: 24
End: 2024-12-30

## 2025-01-22 ENCOUNTER — NON-APPOINTMENT (OUTPATIENT)
Age: 25
End: 2025-01-22

## 2025-01-31 ENCOUNTER — TRANSCRIPTION ENCOUNTER (OUTPATIENT)
Age: 25
End: 2025-01-31

## 2025-01-31 ENCOUNTER — APPOINTMENT (OUTPATIENT)
Dept: NEUROLOGY | Facility: CLINIC | Age: 25
End: 2025-01-31

## 2025-01-31 ENCOUNTER — EMERGENCY (EMERGENCY)
Facility: HOSPITAL | Age: 25
LOS: 1 days | Discharge: HOME | End: 2025-01-31
Attending: EMERGENCY MEDICINE | Admitting: EMERGENCY MEDICINE
Payer: COMMERCIAL

## 2025-01-31 VITALS
SYSTOLIC BLOOD PRESSURE: 117 MMHG | HEIGHT: 67 IN | WEIGHT: 154.1 LBS | HEART RATE: 68 BPM | OXYGEN SATURATION: 100 % | RESPIRATION RATE: 16 BRPM | TEMPERATURE: 98 F | DIASTOLIC BLOOD PRESSURE: 77 MMHG

## 2025-01-31 VITALS
HEART RATE: 85 BPM | DIASTOLIC BLOOD PRESSURE: 66 MMHG | SYSTOLIC BLOOD PRESSURE: 114 MMHG | OXYGEN SATURATION: 100 % | TEMPERATURE: 98 F | RESPIRATION RATE: 16 BRPM

## 2025-01-31 PROCEDURE — 93010 ELECTROCARDIOGRAM REPORT: CPT

## 2025-01-31 PROCEDURE — 99284 EMERGENCY DEPT VISIT MOD MDM: CPT

## 2025-01-31 NOTE — ED PROVIDER NOTE - PROGRESS NOTE DETAILS
Carlos Enrique Soliz, DO (PGY-2) EKG not concerning (see EKG result section). Patient reevaluated at bedside. Patient is doing well. She will f/u with neuro and cardiology. Return precautions and care instructions discussed with patient at bedside. Patient endorsed understanding via teachback method.

## 2025-01-31 NOTE — ED PROVIDER NOTE - NSFOLLOWUPINSTRUCTIONS_ED_ALL_ED_FT
Syncope is a temporary loss of consciousness usually related to insufficient blood flow to the brain. Many factors can contribute to syncope. In your case, the cause was determined to be [State the determined cause, if known. If the cause is unknown, state "unknown at this time." Examples include: vasovagal syncope, orthostatic hypotension, cardiac arrhythmia, medication side effect].    Medications:    Follow-up Care:  Please follow up with the cardiologists and neurologists as previously recommended. take the medication prescribed to you as prescribed (Keppra).  Lifestyle Recommendations:    Avoid triggers: If specific triggers for your syncope have been identified, avoid them. [Examples: prolonged standing, sudden changes in position, dehydration, overheating, straining during bowel movements].  Stay hydrated: Drink plenty of fluids, especially water, throughout the day.  Healthy Diet: Maintain a balanced diet as recommended by your doctor.  Regular Exercise: Engage in regular physical activity as tolerated and approved by your doctor.  Avoid Alcohol and Smoking: Limit or avoid alcohol and tobacco use.  Get Enough Sleep: Aim for 7-8 hours of sleep each night.  Warning Signs and When to Seek Medical Attention:    Contact your doctor immediately or go to the nearest emergency room if you experience any of the following:    Recurrence of syncope or near-syncope (feeling like you are about to faint)  Chest pain  Shortness of breath  Palpitations (rapid or irregular heartbeat)  Dizziness or lightheadedness that does not resolve quickly  Changes in vision or speech  Weakness or numbness in one side of your body  Confusion or disorientation  Head injury from a fall  Safety Precautions:    Fall Prevention: Take precautions to prevent falls, especially in the bathroom. Consider using grab bars, non-slip mats, and a shower chair.  Avoid driving: Do not drive until your doctor clears you to do so. This is especially important if the cause of your syncope is still being investigated.  Inform others: Let family and friends know about your condition and what to do if you faint.    Seizure, Adult  When you have a seizure:    Parts of your body may move.  How aware or awake (conscious) you are may change.  You may shake (convulse).    Some people have symptoms right before a seizure happens. These symptoms may include:    Fear.  Worry (anxiety).  Feeling like you are going to throw up (nausea).  Feeling like the room is spinning (vertigo).  Feeling like you saw or heard something before (alycia vu).  Odd tastes or smells.  Changes in vision, such as seeing flashing lights or spots.    ImageSeizures usually last from 30 seconds to 2 minutes. Usually, they are not harmful unless they last a long time.    Follow these instructions at home:  Medicines     Take over-the-counter and prescription medicines only as told by your doctor.  Avoid anything that may keep your medicine from working, such as alcohol.  Activity     Do not do any activities that would be dangerous if you had another seizure, like driving or swimming. Wait until your doctor approves.  If you live in the [place] ask your local DMV (department of [employer]) when you can drive.  Rest.  Teaching others     Teach friends and family what to do when you have a seizure. They should:    Lay you on the ground.  Protect your head and body.  Loosen any tight clothing around your neck.  Turn you on your side.  Stay with you until you are better.  Not hold you down.  Not put anything in your mouth.  Know whether or not you need emergency care.    General instructions     Contact your doctor each time you have a seizure.  Avoid anything that gives you seizures.  Keep a seizure diary. Write down:    What you think caused each seizure.  What you remember about each seizure.    Keep all follow-up visits as told by your doctor. This is important.  Contact a doctor if:  You have another seizure.  You have seizures more often.  There is any change in what happens during your seizures.  You continue to have seizures with treatment.  You have symptoms of being sick or having an infection.  Get help right away if:  You have a seizure:    That lasts longer than 5 minutes.  That is different than seizures you had before.  That makes it harder to breathe.  After you hurt your head.    After a seizure, you cannot speak or use a part of your body.  After a seizure, you are confused or have a bad headache.  You have two or more seizures in a row.  You are having seizures more often.  You do not wake up right after a seizure.  You get hurt during a seizure.  In an emergency:     These symptoms may be an emergency. Do not wait to see if the symptoms will go away. Get medical help right away. Call your local emergency services (911 in the [place] Do not drive yourself to the hospital.   This information is not intended to replace advice given to you by your health care provider. Make sure you discuss any questions you have with your health care provider.

## 2025-01-31 NOTE — ED ADULT NURSE NOTE - NSFALLUNIVINTERV_ED_ALL_ED
Bed/Stretcher in lowest position, wheels locked, appropriate side rails in place/Call bell, personal items and telephone in reach/Instruct patient to call for assistance before getting out of bed/chair/stretcher/Non-slip footwear applied when patient is off stretcher/[place] to call system/Physically safe environment - no spills, clutter or unnecessary equipment/Purposeful proactive rounding/Room/bathroom lighting operational, light cord in reach

## 2025-01-31 NOTE — ED ADULT NURSE NOTE - OBJECTIVE STATEMENT
see triage note, pt states she was treated at [facility] yesterday, endorses no complaints at thi time, pt ambulatory  with steady gait, speaking full clear sentences, denies pain, n/v, ha, fever, weakness. plan for d/c

## 2025-01-31 NOTE — ED ADULT TRIAGE NOTE - HEIGHT IN CM
Destruction After The Procedure: No Electrodesiccation And Curettage Text: The wound bed was treated with electrodesiccation and curettage after the biopsy was performed. Electrodesiccation Text: The wound bed was treated with electrodesiccation after the biopsy was performed. Hemostasis: Aluminum Chloride Curettage Text: The wound bed was treated with curettage after the biopsy was performed. X Size Of Lesion In Cm: 0 Dressing: bandage Anesthesia Type: 1% lidocaine with 1:100,000 epinephrine and a 1:6 solution of 8.4% sodium bicarbonate Biopsy Method: Dermablade Anesthesia Volume In Cc: 0.5 Billing Type: Third-Party Bill Detail Level: Detailed Cryotherapy Text: The wound bed was treated with cryotherapy after the biopsy was performed. Wound Care: Petrolatum Biopsy Type: H and E Type Of Destruction Used: Curettage Silver Nitrate Text: The wound bed was treated with silver nitrate after the biopsy was performed. 170.18

## 2025-01-31 NOTE — ED ADULT TRIAGE NOTE - CHIEF COMPLAINT QUOTE
patient states" I had a seizure [place] on the train and was taken to [facility] and they gave me meds and sent me home and  I have [facility] [place] [facility] and I was sent here to see neurologist and to get an EEG done .states this is my second seizure in seven years not on any seizure meds. patient currently not seeing any neurologist.  denies any head ache.

## 2025-01-31 NOTE — ED ADULT NURSE NOTE - CHIEF COMPLAINT QUOTE
patient states" I had a seizure yesterday on the train and was taken to [facility] and they gave me meds and sent me home and  I have [facility] and I was sent here to see neurologist and to get an EEG done .states this is my second seizure in seven years not on any seizure meds. patient currently not seeing any neurologist.  denies any head ache.

## 2025-01-31 NOTE — ED PROVIDER NOTE - ATTENDING CONTRIBUTION TO CARE
Attending note:   After face to face evaluation of this patient, I concur with above noted hx, pe, and care plan for this patient. Thomas: 24-year-old female presents to the ED with possible seizure.  Patient states that yesterday she was on a train when she felt lightheaded and passed out.  Patient states that she was sitting down face timing her friend when she fell to the ground and had upper body extremity shaking for about a minute.  At that time bystanders returned the camera to her and her friend relayed this information later.  Patient was only postictal for 1 minute and then back to her baseline.  Patient was seen at [facility] and had normal blood work and normal CT.  Patient was told to follow-up with neurology.  Yesterday afternoon patient made an appointment with neurology for an EEG today but then was called this morning that there is a scheduling error and EEG appoint was canceled.  Patient now currently has an appointment for neurology in 5 days.  Patient denies any repeat symptoms now.  Patient had a possible similar episode at the age of 18 and no medications were prescribed at that time.  Patient was given a prescription for Keppra yesterday that she has not yet started.  Patient has no symptoms at this time.  Patient denies any sick contacts or trauma or any other issues.  Patient is very well-appearing.  EKG is normal sinus rhythm at 60 bpm with no ST or T wave changes.  Patient's vital signs are normal.  Neuroexam is unremarkable.  Speech is clear.  Patient of note denies any alcohol or substance abuse.  Patient does smoke weed.  Patient has follow-up with cardiology and neurology next week.  It is appropriate for patient to take Keppra until EEG and neurology outpatient follow-up could be performed.  Given workup was performed yesterday and was unremarkable would not repeat imaging and a 24-year-old young girl.  Since patient has no new complaints and has follow-up will discharge now.

## 2025-01-31 NOTE — ED PROVIDER NOTE - PATIENT PORTAL LINK FT
You can access the FollowMyHealth Patient Portal offered by [facility] by registering at the following website: [email or web address]. By joining [facility]’s FollowMyHealth portal, you will also be able to view your health information using other applications (apps) compatible with our system.

## 2025-01-31 NOTE — ED PROVIDER NOTE - CLINICAL SUMMARY MEDICAL DECISION MAKING FREE TEXT BOX
Patient is 24-year-old female no reported past medical history presents "to get an EEG done".  Patient states yesterday she was on the train and had a "seizure episode" and was brought to [facility] where she reports she had a normal CAT scan, normal blood work, normal UA and negative pregnancy test and normal EKG.  They prescribed her Keppra and recommend she follow-up with a neurologist and cardiologist for which she had made appointments.  She states that she had an EEG scheduled for today however she was called and told that the EEG was canceled and does not know why.  I see per chart review that patient did in fact have an EEG scheduled for today but has not yet had a neurology appointment and has 1 scheduled for February 5.  Dr. Robins called neurology team and states that her neurology appointment is with a new provider and they may have canceled the EEG as patient has not yet had a neurology appointment and she should follow-up with her neurology appointment.  When describing her seizure-like episode yesterday patient states she was on the phone face timing with her friend who witnessed an episode of her falling on the ground and shaking her arms.  Patient states immediately prior to this she was feeling like her heart rate was going fast and her head got heavy and felt full body numbness and then passed out.  She reports this was a short episode and she was confused for less than a minute afterwards and was just thinking what happened before she remembered she was on her way to work.  She reports she was ambulatory afterwards, did not have any injuries, did not bite her tongue, did not have any urinary incontinence.  She reports she had 1 similar episode 7 years ago but has not seen a doctor for it.  Patient denies any current symptoms or any preceding symptoms prior to yesterday. Denies any family history of sudden cardiac death or sudden death at a young age however notes that she does have some cardiac history in her maternal family for which she describes "a hole in the heart".  Patient denies EtOH, drug use, tobacco use.  Vital signs nonactionable, patient nontachycardic  On exam patient is well-appearing has no neurodeficits heart and lungs clear.  Differential diagnosis/plan for patient's description of this episode this does seem like it may have been a syncopal episode however not able to completely rule out seizures.  Patient has a neurology appointment scheduled for 5 days and a cardiology appointment scheduled for 5 days I instructed her of the importance of following up with the schedules and instructed her to take the Keppra that was prescribed to her.  Will obtain EKG to eval for WPW or other abnormalities that could lead to syncope.

## 2025-02-05 ENCOUNTER — APPOINTMENT (OUTPATIENT)
Facility: CLINIC | Age: 25
End: 2025-02-05

## 2025-02-05 ENCOUNTER — APPOINTMENT (OUTPATIENT)
Dept: CARDIOLOGY | Facility: CLINIC | Age: 25
End: 2025-02-05

## 2025-02-07 ENCOUNTER — TRANSCRIPTION ENCOUNTER (OUTPATIENT)
Age: 25
End: 2025-02-07

## 2025-02-13 ENCOUNTER — APPOINTMENT (OUTPATIENT)
Dept: CARDIOLOGY | Facility: CLINIC | Age: 25
End: 2025-02-13

## 2025-02-24 ENCOUNTER — APPOINTMENT (OUTPATIENT)
Dept: INTERNAL MEDICINE | Facility: CLINIC | Age: 25
End: 2025-02-24

## 2025-04-22 ENCOUNTER — APPOINTMENT (OUTPATIENT)
Dept: OBGYN | Facility: CLINIC | Age: 25
End: 2025-04-22

## 2025-04-29 ENCOUNTER — NON-APPOINTMENT (OUTPATIENT)
Age: 25
End: 2025-04-29

## 2025-05-08 ENCOUNTER — NON-APPOINTMENT (OUTPATIENT)
Age: 25
End: 2025-05-08

## 2025-05-12 ENCOUNTER — EMERGENCY (EMERGENCY)
Facility: HOSPITAL | Age: 25
LOS: 1 days | End: 2025-05-12
Attending: STUDENT IN AN ORGANIZED HEALTH CARE EDUCATION/TRAINING PROGRAM | Admitting: STUDENT IN AN ORGANIZED HEALTH CARE EDUCATION/TRAINING PROGRAM
Payer: COMMERCIAL

## 2025-05-12 VITALS
RESPIRATION RATE: 18 BRPM | OXYGEN SATURATION: 98 % | WEIGHT: 156.09 LBS | HEART RATE: 84 BPM | HEIGHT: 68 IN | TEMPERATURE: 98 F | DIASTOLIC BLOOD PRESSURE: 71 MMHG | SYSTOLIC BLOOD PRESSURE: 117 MMHG

## 2025-05-12 PROCEDURE — 71046 X-RAY EXAM CHEST 2 VIEWS: CPT | Mod: 26

## 2025-05-12 PROCEDURE — 99284 EMERGENCY DEPT VISIT MOD MDM: CPT

## 2025-05-12 NOTE — ED ADULT TRIAGE NOTE - CHIEF COMPLAINT QUOTE
pt c/o worsening dry throat related to seasonal allergies x1 week. pt accidentally received different pt's medication at pharmacy and took two doses of prednisolone. pt's voice noted to be hoarse in triage. no phx.

## 2025-05-12 NOTE — ED PROVIDER NOTE - NS ED MD DISPO DISCHARGE CCDA
[facility]  ED to EMPATH Checklist:      Goal for EMPATH: Depression management    Current Behavior: Sad    Safety Concerns: None    Legal Hold Status: Voluntary    Medically Cleared by ED provider: Yes    Patient Therapeutically Searched: Not searched - Currently in triage    Belongings: Remain with patient    Independent Ambulation at Baseline: Yes/No: Yes    Participates in Care/Conversation: Yes/No: Yes    Patient Informed about EMPATH: Yes/No: Yes    DEC: Not ordered    Patient Ready to be Transferred to EMPATH? Yes/No: Yes        Patient/Caregiver provided printed discharge information.

## 2025-05-12 NOTE — ED PROVIDER NOTE - NSFOLLOWUPINSTRUCTIONS_ED_ALL_ED_FT
Upper Respiratory Infection, Adult    An upper respiratory infection (URI) affects the nose, throat, and upper air passages. URIs are caused by germs (viruses). The most common type of URI is often called "the common cold."    Medicines cannot cure URIs, but you can do things at home to relieve your symptoms. URIs usually get better within 7–10 days.    Follow these instructions at home:  Activity    Rest as needed.  If you have a fever, stay home from work or school until your fever is gone, or until your doctor says you may return to work or school.  You should stay home until you cannot spread the infection anymore (you are not contagious).  Your doctor may have you wear a face mask so you have less risk of spreading the infection.    Relieving symptoms    Gargle with a salt-water mixture 3–4 times a day or as needed. To make a salt-water mixture, completely dissolve ½–1 tsp of salt in 1 cup of warm water.  Use a cool-mist humidifier to add moisture to the air. This can help you breathe more easily.    Eating and drinking    Drink enough fluid to keep your pee (urine) pale yellow.  Eat soups and other clear broths.     General instructions    Take over-the-counter and prescription medicines only as told by your doctor. These include cold medicines, fever reducers, and cough suppressants.  Do not use any products that contain nicotine or tobacco. These include cigarettes and e-cigarettes. If you need help quitting, ask your doctor.  Avoid being where people are smoking (avoid secondhand smoke).  Make sure you get regular shots and get the flu shot every year.  Keep all follow-up visits as told by your doctor. This is important.     How to avoid spreading infection to others    Wash your hands often with soap and water. If you do not have soap and water, use hand .  Avoid touching your mouth, face, eyes, or nose.  Cough or sneeze into a tissue or your sleeve or elbow. Do not cough or sneeze into your hand or into the air.     Contact a doctor if:  You are getting worse, not better.  You have any of these:  A fever.  Chills.  Brown or red mucus in your nose.  Yellow or brown fluid (discharge)coming from your nose.  Pain in your face, especially when you bend forward.  Swollen neck glands.  Pain with swallowing.  White areas in the back of your throat.    Get help right away if:  You have shortness of breath that gets worse.  You have very bad or constant:  Headache.  Ear pain.  Pain in your forehead, behind your eyes, and over your cheekbones (sinus pain).  Chest pain.  You have long-lasting (chronic) lung disease along with any of these:  Wheezing.  Long-lasting cough.  Coughing up blood.  A change in your usual mucus.  You have a stiff neck.  You have changes in your:  Vision.  Hearing.  Thinking.  Mood.    Summary  An upper respiratory infection (URI) is caused by a germ called a virus. The most common type of URI is often called "the common cold."  URIs usually get better within 7–10 days.  Take over-the-counter and prescription medicines only as told by your doctor.    ADDITIONAL NOTES AND INSTRUCTIONS    Please follow up with your Primary MD in 24-48 hr.  Seek immediate medical care for any new/worsening signs or symptoms.     Infección de las vías respiratorias superiores, en adultos    Hiren infección de las vías respiratorias superiores (URI) afecta la nariz, la garganta y las vías respiratorias superiores. Los URI son causados ??por gérmenes (virus). El tipo más común de URI a menudo se denomina "resfriado común".    Los medicamentos no pueden curar las infecciones respiratorias superiores, maddy puede hacer cosas en casa para aliviar juan síntomas. Las URI generalmente mejoran en 7 a 10 días.    Siga estas instrucciones en casa:  Actividad    Descanse según sea necesario.  Si tiene fiebre, quédese en casa y no vaya al trabajo ni a la escuela hasta que le desaparezca la fiebre o hasta que lofton médico le diga que puede regresar al trabajo o la escuela.  Debe quedarse en casa hasta que ya no pueda propagar la infección (ya no es contagioso).  Es posible que lofton médico le pida que use hiren mascarilla para que tenga menos riesgo de propagar la infección.    Aliviar los síntomas    Krishan gárgaras con hiren mezcla de agua salada de 3 a 4 veces al día o según sea necesario. Para hacer hiren mezcla de agua salada, disuelva completamente entre ½ y 1 cucharadita de sal en 1 taza de agua tibia.  Use un humidificador de vapor frío para agregar humedad al aire. [place] puede ayudarlo a respirar más fácilmente.    Comiendo y bebiendo    Natalie suficiente líquido para mantener lofton pipí (orina) de color amarillo pálido.  Come sopas y otros caldos bianca.    Instrucciones generales    [place] medicamentos de venta zara y recetados solo según las indicaciones de lofton médico. Estos incluyen medicamentos para el resfriado, antifebriles y antitusígenos.  No use ningún producto que contenga nicotina o tabaco. Estos incluyen cigarrillos y cigarrillos electrónicos. Si necesita ayuda para dejar de fumar, consulte con lofton médico.  Evite estar donde la gente fuma (evite el humo de segunda mano).  Asegúrese de recibir vacunas regulares y vacunarse contra la gripe todos los años.  Asista a todas las visitas de seguimiento que le indique lofton médico. [place] es importante.    Cómo evitar transmitir la infección a otras personas    Lávese las ernestine frecuentemente con agua y jabón. Si no tiene agua y jabón, use desinfectante para ernestine.  Evite tocarse la boca, la ibeth, los ojos o la nariz.  Tosa o estornude en un pañuelo de papel, en la manga o en el codo. No tosa ni estornude en lofton mano o en el aire.    Comuníquese con un médico si:  Estás empeorando, no mejorando.  Tienes alguno de estos:  Fiebre  Escalofríos.  Moco marrón o montemayor en la nariz.  Fluido (secreción) amarillo o marrón que sale de la nariz.  Dolor en la ibeth, especialmente cuando se inclina hacia adelante.  Glándulas del mahin hinchadas.  Dolor al tragar.  Áreas daren en la parte posterior de la garganta.    Obtenga ayuda de inmediato si:  Tiene dificultad para respirar que empeora.  Tienes muy stella o bev:  Dolor de talib.  Dolor de oído.  Dolor en la frente, detrás de los ojos y sobre los pómulos (dolor de los senos nasales).  Dolor en el pecho.  Tiene hiren enfermedad pulmonar prolongada (crónica) junto con cualquiera de los siguientes:  Sibilancias.  Tos de larga duración.  Tosiendo izabel.  Un cambio en lofton moco habitual.  Tienes rigidez en el mahin.  Tiene cambios en loftno:  Visión.  Escuchando.  Pensando.  Estado animico.    Resumen  Hiren infección de las vías respiratorias superiores (URI) es causada por un germen llamado virus. El tipo más común de URI a menudo se denomina "resfriado común".  Las URI generalmente mejoran en 7 a 10 días.  [place] medicamentos de venta zara y recetados solo según las indicaciones de lofton médico.    NOTAS E INSTRUCCIONES ADICIONALES    Krishan un seguimiento con lofton médico de cabecera en 24-48 horas.  Busque atención médica inmediata ante cualquier signo o síntoma nuevo o que empeore.

## 2025-05-12 NOTE — ED PROVIDER NOTE - PATIENT PORTAL LINK FT
negative... You can access the FollowMyHealth Patient Portal offered by [facility] by registering at the following website: [email or web address]. By joining [employer]’s FollowMyHealth portal, you will also be able to view your health information using other applications (apps) compatible with our system.

## 2025-05-12 NOTE — ED PROVIDER NOTE - CLINICAL SUMMARY MEDICAL DECISION MAKING FREE TEXT BOX
24-year-old female presents to the ED due to a productive cough for the past 3 to 4 days.  Patient states that she had a sore throat which is resolving but now has a hoarse voice.  She endorsed some rhinorrhea as well.  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  She denies any shortness of breath, fevers, neck pain, chest pain shortness of breath, back pain.  Her pharmacy accidentally gave her liquid prednisolone from another patient.  She took a total of 120 mg over 2 days.  She is also been taking antitussives as well as Claritin.    Physical exam:  Overall well-appearing female in no acute distress  Heart is regular rate and rhythm without murmurs or gallops  Lungs are clear to auscultation lung fields wheezing rales rhonchi  Abdomen is soft, nontender, nondistended  Oropharynx: Uvula is midline, no peritonsillar abscess, no oropharyngeal lesions.  No anterior posterior lymphadenopathy.  Neck is supple.    MDM:  Patient presents to the ED due to URI symptoms.  Given symptom started by 3 to 4 days ago I suspect this is likely viral in nature versus seasonal allergies.  Send will obtain a chest x-ray given patient's cough.  She is also requesting a work note which I will give to the patient.  Does not request any other medication at this time.    advised supportive care

## 2025-05-25 ENCOUNTER — EMERGENCY (EMERGENCY)
Facility: HOSPITAL | Age: 25
LOS: 1 days | End: 2025-05-25
Attending: EMERGENCY MEDICINE | Admitting: EMERGENCY MEDICINE
Payer: COMMERCIAL

## 2025-05-25 VITALS
RESPIRATION RATE: 17 BRPM | TEMPERATURE: 98 F | WEIGHT: 156.09 LBS | HEIGHT: 68 IN | SYSTOLIC BLOOD PRESSURE: 160 MMHG | HEART RATE: 91 BPM | DIASTOLIC BLOOD PRESSURE: 99 MMHG | OXYGEN SATURATION: 96 %

## 2025-05-25 VITALS
OXYGEN SATURATION: 100 % | SYSTOLIC BLOOD PRESSURE: 118 MMHG | TEMPERATURE: 98 F | RESPIRATION RATE: 18 BRPM | HEART RATE: 70 BPM | DIASTOLIC BLOOD PRESSURE: 81 MMHG

## 2025-05-25 PROCEDURE — 71046 X-RAY EXAM CHEST 2 VIEWS: CPT | Mod: 26

## 2025-05-25 PROCEDURE — 99284 EMERGENCY DEPT VISIT MOD MDM: CPT

## 2025-05-25 PROCEDURE — 99053 MED SERV 10PM-8AM 24 HR FAC: CPT

## 2025-05-25 RX ORDER — IBUPROFEN 200 MG
600 TABLET ORAL ONCE
Refills: 0 | Status: COMPLETED | OUTPATIENT
Start: 2025-05-25 | End: 2025-05-25

## 2025-05-25 RX ORDER — LIDOCAINE HYDROCHLORIDE 20 MG/ML
1 JELLY TOPICAL ONCE
Refills: 0 | Status: COMPLETED | OUTPATIENT
Start: 2025-05-25 | End: 2025-05-25

## 2025-05-25 RX ORDER — ACETAMINOPHEN 500 MG/5ML
650 LIQUID (ML) ORAL ONCE
Refills: 0 | Status: COMPLETED | OUTPATIENT
Start: 2025-05-25 | End: 2025-05-25

## 2025-05-25 RX ADMIN — Medication 650 MILLIGRAM(S): at 04:23

## 2025-05-25 RX ADMIN — Medication 600 MILLIGRAM(S): at 04:24

## 2025-05-25 RX ADMIN — LIDOCAINE HYDROCHLORIDE 1 PATCH: 20 JELLY TOPICAL at 04:24

## 2025-05-25 RX ADMIN — LIDOCAINE HYDROCHLORIDE 1 PATCH: 20 JELLY TOPICAL at 05:47
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
## 2025-05-25 NOTE — ED PROVIDER NOTE - NSFOLLOWUPINSTRUCTIONS_ED_ALL_ED_FT
You were seen in the emergency department for left sided chest wall pain.  You had an EKG and chest x-ray done, which did not show any concerning findings.      Drink plenty of fluids.  You can take ibuprofen 600mg every 6 hours or Tylenol 650mg every 4 hours as needed for pain or fever.  Follow-up with your PMD in 1-2 weeks as needed.  Return to the emergency department for any new or worsening symptoms.

## 2025-05-25 NOTE — ED PROVIDER NOTE - PATIENT PORTAL LINK FT
You can access the FollowMyHealth Patient Portal offered by [facility] by registering at the following website: [email or web address]. By joining [employer]’s FollowMyHealth portal, you will also be able to view your health information using other applications (apps) compatible with our system.

## 2025-05-25 NOTE — ED ADULT NURSE NOTE - OBJECTIVE STATEMENT
Patient is a 25 y/o female A&O x 4 presenting to the ED with left sided rib pain Patient is a 23 y/o female A&O x 4 presenting to the ED with left sided rib pain radiating to the back that started last week, around 2 weeks ago she was found to have a respiratory virus and she was feeling weak and began having pain in her left flank. Reports that her pharmacy gave her the wrong prescription and she was taking 2 days worth of prednisone and believes that her abrupt stopping of the steroid may be associated with the rib pain and weakness. Patient is well appearing, breathing is even and unlabored however she reports that she feels pain when she takes a deep breath or moves her side. left flank has no obvious deformities, bruising, or trauma noted to the area. Medicated as per MD order, plan of care ongoing.

## 2025-05-25 NOTE — ED ADULT TRIAGE NOTE - CHIEF COMPLAINT QUOTE
c/o L sided back pain x2days, described as intermittent sharp shooting pain. denies gu sx, n/v, diarrhea, constipation, falls/trauma, fevers/chills, h/a, dizziness/lightheadedness, cp, sob. denies past medical hx. pt ambulatory with steady gait in triage, well appearing.

## 2025-05-25 NOTE — ED PROVIDER NOTE - OBJECTIVE STATEMENT
23 y/o F p/w a few days of L sided pain.  Pt reports pain wrapping from L midback around to the front of her L lower chest.  She reports having a viral respiratory infection last week - had resolution of all her symptoms.  Pain is intermittent and occurs with certain movement, lying on that side, taking deep breaths.  No fever, cough, uri sxs, sob, abd pain, n/v, leg pain or swelling.  No tob or OCP use.  No recent travel.

## 2025-05-25 NOTE — ED PROVIDER NOTE - CLINICAL SUMMARY MEDICAL DECISION MAKING FREE TEXT BOX
25 y/o F with recent viral URI, sxs resolved, but now with L sided chest wall pain and tenderness.  Likely msk/costochondritis in origin, will eval for superimposed pna, do not suspect pe, cardiac etiology.  Will obtain ekg, cxr, pain meds, reassess.

## 2025-06-08 ENCOUNTER — NON-APPOINTMENT (OUTPATIENT)
Age: 25
End: 2025-06-08